# Patient Record
Sex: MALE | Race: WHITE | Employment: FULL TIME | URBAN - METROPOLITAN AREA
[De-identification: names, ages, dates, MRNs, and addresses within clinical notes are randomized per-mention and may not be internally consistent; named-entity substitution may affect disease eponyms.]

---

## 2017-03-06 RX ORDER — CEPHALEXIN 500 MG/1
CAPSULE ORAL
Qty: 8 CAPSULE | Refills: 1 | Status: SHIPPED | OUTPATIENT
Start: 2017-03-06 | End: 2020-11-24

## 2017-09-15 ENCOUNTER — TELEPHONE (OUTPATIENT)
Dept: ORTHOPEDIC SURGERY | Age: 57
End: 2017-09-15

## 2017-09-15 RX ORDER — CEPHALEXIN 500 MG/1
CAPSULE ORAL
Qty: 8 CAPSULE | Refills: 1 | Status: SHIPPED | OUTPATIENT
Start: 2017-09-15 | End: 2020-11-24

## 2018-01-21 ENCOUNTER — TELEPHONE (OUTPATIENT)
Dept: ORTHOPEDIC SURGERY | Age: 58
End: 2018-01-21

## 2018-01-25 ENCOUNTER — OFFICE VISIT (OUTPATIENT)
Dept: ORTHOPEDIC SURGERY | Age: 58
End: 2018-01-25

## 2018-01-25 VITALS
DIASTOLIC BLOOD PRESSURE: 84 MMHG | HEART RATE: 71 BPM | TEMPERATURE: 98.1 F | BODY MASS INDEX: 30.01 KG/M2 | SYSTOLIC BLOOD PRESSURE: 140 MMHG | RESPIRATION RATE: 16 BRPM | HEIGHT: 68 IN | WEIGHT: 198 LBS

## 2018-01-25 DIAGNOSIS — Z96.652 HISTORY OF TOTAL LEFT KNEE REPLACEMENT: Primary | ICD-10-CM

## 2018-01-25 PROCEDURE — 99202 OFFICE O/P NEW SF 15 MIN: CPT | Performed by: ORTHOPAEDIC SURGERY

## 2018-01-25 NOTE — PROGRESS NOTES
This dictation was done with [employer] dictation and may contain mechanical errors related to translation. Blood pressure (!) 140/84, pulse 71, temperature 98.1 °F (36.7 °C), temperature source Temporal, resp. rate 16, height 5' 8\" (1.727 m), weight 198 lb (89.8 kg).

## 2018-01-27 NOTE — PROGRESS NOTES
Patient is a 50-year-old male presents today complaining of swelling of his left total knee arthroplasty. The patient is status post index procedure performed in January 2012 now 6 years ago. This patient has not been seen in postop since his initial evaluation postoperatively. I have no x-rays is other than his initial postoperative films since he avoided or electively chose not to be seen. The patient has done very well with good range of motion is been very active plays a lot of golf and continues to work. He recently noted increased swelling of his knee but not significant increased pain. He has no new history of injury or surgery to the left kneee. Patient Active Problem List   Diagnosis    Left knee DJD    Physical exam, annual     Prior to Visit Medications    Medication Sig Taking? Authorizing Provider   diclofenac sodium (VOLTAREN) 1 % GEL Apply 4 g topically 4 times daily  Elizabeth Chaves MD   cephALEXin (KEFLEX) 500 MG capsule 2 tablets by mouth 1 hour before and 1 hour after procedure  Elizabeth Chaves MD   cephALEXin (KEFLEX) 500 MG capsule 2 tablets by mouth 1 hour before and 1 hour after procedure  Elizabeth Chaves MD   clindamycin (CLEOCIN) 300 MG capsule 1 tablet by mouth 1 hour before and 1 hour after procedure  Elizabeth Chaves MD   diclofenac sodium (VOLTAREN) 1 % GEL Apply 4 g topically 4 times daily  SHADY Gonsales   ibuprofen (ADVIL;MOTRIN) 200 MG tablet Take 200 mg by mouth every 6 hours as needed. Historical Provider, MD   diclofenac sodium (VOLTAREN) 1 % GEL Apply 4 g topically 4 times daily for 30 days. Elizabeth Chaves MD     Physical examination 50-year-old male oriented ×3 temperature is 98.1. Examination of the patient's back shows good range of motion no specific pain tenderness or instability. Motor exam shows quadriceps hamstrings hip abductors and abductors foot plantar dorsiflexors are intact 4-5 over 5 to the left lower extremity.   Sensation patient will eventually need to undergo revision however at this point time I see no septic event that would demand revision knee surgery. I would like to see him back in about 2-3 months or p.r.n. with repeat x-rays at that time. Also if this effusion persists we probably need to get a intra-articular aspiration culture and sent it for septic workup. We will follow him up as scheduled or p.r.n.

## 2018-08-30 ENCOUNTER — OFFICE VISIT (OUTPATIENT)
Dept: ORTHOPEDIC SURGERY | Age: 58
End: 2018-08-30

## 2018-08-30 VITALS — BODY MASS INDEX: 30.01 KG/M2 | TEMPERATURE: 97.3 F | HEIGHT: 68 IN | WEIGHT: 198 LBS

## 2018-08-30 DIAGNOSIS — M25.462 EFFUSION OF BURSA OF LEFT KNEE: ICD-10-CM

## 2018-08-30 DIAGNOSIS — Z96.652 HISTORY OF TOTAL LEFT KNEE REPLACEMENT: Primary | ICD-10-CM

## 2018-08-30 PROCEDURE — 20610 DRAIN/INJ JOINT/BURSA W/O US: CPT | Performed by: ORTHOPAEDIC SURGERY

## 2018-08-30 PROCEDURE — 99212 OFFICE O/P EST SF 10 MIN: CPT | Performed by: ORTHOPAEDIC SURGERY

## 2018-09-20 ENCOUNTER — TELEPHONE (OUTPATIENT)
Dept: ORTHOPEDIC SURGERY | Age: 58
End: 2018-09-20

## 2018-09-20 RX ORDER — CEPHALEXIN 500 MG/1
CAPSULE ORAL
Qty: 8 CAPSULE | Refills: 1 | Status: SHIPPED | OUTPATIENT
Start: 2018-09-20 | End: 2020-11-24

## 2018-11-29 ENCOUNTER — OFFICE VISIT (OUTPATIENT)
Dept: ORTHOPEDIC SURGERY | Age: 58
End: 2018-11-29
Payer: COMMERCIAL

## 2018-11-29 VITALS
HEART RATE: 75 BPM | SYSTOLIC BLOOD PRESSURE: 142 MMHG | WEIGHT: 198 LBS | BODY MASS INDEX: 30.01 KG/M2 | TEMPERATURE: 97.4 F | HEIGHT: 68 IN | DIASTOLIC BLOOD PRESSURE: 85 MMHG

## 2018-11-29 DIAGNOSIS — Z96.652 HISTORY OF TOTAL KNEE ARTHROPLASTY, LEFT: Primary | ICD-10-CM

## 2018-11-29 PROCEDURE — 99212 OFFICE O/P EST SF 10 MIN: CPT | Performed by: ORTHOPAEDIC SURGERY

## 2018-11-29 NOTE — PROGRESS NOTES
This dictation was done with [employer] dictation and may contain mechanical errors related to translation. Blood pressure (!) 142/85, pulse 75, temperature 97.4 °F (36.3 °C), temperature source Temporal, height 5' 8\" (1.727 m), weight 198 lb (89.8 kg).

## 2018-12-09 NOTE — PROGRESS NOTES
Patient is a 39-year-old male who is status post left total knee arthroplasty performed about 6 years ago on January 2012. We've been following him for increased swelling and x-ray findings which may show radiographic loosening and ostial lysis of the knee joint. He on his last office visit underwent aspiration of his knee joint and synovial evaluation. The results of his aspiration were negative for alpha defensins poly-peptide along with a white blood cell count of 1899 negative bacterial panels of pediatric knees Staphylococcus aureus [place] ([facility]) and enterococcus. To date there is no growth on the aspiration. Patient Active Problem List   Diagnosis    Left knee DJD     Prior to Visit Medications    Medication Sig Taking? Authorizing Provider   cephALEXin (KEFLEX) 500 MG capsule 2 tablets by mouth 1 hour before and 1 hour after procedure  Fide Morales MD   diclofenac sodium (VOLTAREN) 1 % GEL Apply 4 g topically 4 times daily  Fide Morales MD   cephALEXin (KEFLEX) 500 MG capsule 2 tablets by mouth 1 hour before and 1 hour after procedure  Fide Morales MD   cephALEXin (KEFLEX) 500 MG capsule 2 tablets by mouth 1 hour before and 1 hour after procedure  Fide Morales MD   clindamycin (CLEOCIN) 300 MG capsule 1 tablet by mouth 1 hour before and 1 hour after procedure  Fide Morales MD   diclofenac sodium (VOLTAREN) 1 % GEL Apply 4 g topically 4 times daily  SHADY Long CNP   ibuprofen (ADVIL;MOTRIN) 200 MG tablet Take 200 mg by mouth every 6 hours as needed. Historical Provider, MD   diclofenac sodium (VOLTAREN) 1 % GEL Apply 4 g topically 4 times daily for 30 days. Fide Morales MD     Physical examination 39-year-old male oriented ×3 temperature is 97.4. Examination of the left knee shows well-healed anterior scar full extension 130° of flexion with a mild effusion. Sensation and perfusion are intact to the left foot and ankle.   No obvious cutaneous lesions

## 2020-11-24 RX ORDER — CEPHALEXIN 500 MG/1
CAPSULE ORAL
Qty: 8 CAPSULE | Refills: 1 | Status: SHIPPED | OUTPATIENT
Start: 2020-11-24

## 2021-03-12 ENCOUNTER — NURSE ONLY (OUTPATIENT)
Dept: PRIMARY CARE CLINIC | Age: 61
End: 2021-03-12
Payer: COMMERCIAL

## 2021-03-12 DIAGNOSIS — Z23 HIGH PRIORITY FOR COVID-19 VIRUS VACCINATION: Primary | ICD-10-CM

## 2021-03-12 PROCEDURE — 91300 COVID-19, PFIZER VACCINE 30MCG/0.3ML DOSE: CPT | Performed by: NURSE PRACTITIONER

## 2021-03-12 PROCEDURE — 0001A COVID-19, PFIZER VACCINE 30MCG/0.3ML DOSE: CPT | Performed by: NURSE PRACTITIONER

## 2021-04-02 PROCEDURE — 0002A COVID-19, PFIZER VACCINE 30MCG/0.3ML DOSE: CPT | Performed by: NURSE PRACTITIONER

## 2021-04-02 PROCEDURE — 91300 COVID-19, PFIZER VACCINE 30MCG/0.3ML DOSE: CPT | Performed by: NURSE PRACTITIONER

## 2021-04-07 ENCOUNTER — NURSE ONLY (OUTPATIENT)
Dept: PRIMARY CARE CLINIC | Age: 61
End: 2021-04-07
Payer: COMMERCIAL

## 2021-04-07 DIAGNOSIS — Z23 HIGH PRIORITY FOR COVID-19 VIRUS VACCINATION: Primary | ICD-10-CM

## 2022-01-11 RX ORDER — AMOXICILLIN 500 MG/1
CAPSULE ORAL
Qty: 4 CAPSULE | Refills: 1 | Status: SHIPPED | OUTPATIENT
Start: 2022-01-11

## 2022-12-29 ENCOUNTER — TELEPHONE (OUTPATIENT)
Dept: ORTHOPEDIC SURGERY | Age: 62
End: 2022-12-29

## 2022-12-29 RX ORDER — AMOXICILLIN 500 MG/1
CAPSULE ORAL
Qty: 4 CAPSULE | Refills: 1 | Status: SHIPPED | OUTPATIENT
Start: 2022-12-29

## 2023-11-07 ENCOUNTER — TELEPHONE (OUTPATIENT)
Dept: ORTHOPEDIC SURGERY | Age: 63
End: 2023-11-07

## 2023-11-07 RX ORDER — AMOXICILLIN 500 MG/1
CAPSULE ORAL
Qty: 4 CAPSULE | Refills: 1 | Status: SHIPPED | OUTPATIENT
Start: 2023-11-07

## 2024-11-21 ENCOUNTER — TELEPHONE (OUTPATIENT)
Dept: ORTHOPEDIC SURGERY | Age: 64
End: 2024-11-21

## 2024-11-21 RX ORDER — AMOXICILLIN 500 MG/1
CAPSULE ORAL
Qty: 4 CAPSULE | Refills: 1 | Status: CANCELLED | OUTPATIENT
Start: 2024-11-21

## 2024-11-21 RX ORDER — AMOXICILLIN 500 MG/1
CAPSULE ORAL
Qty: 4 CAPSULE | Refills: 1 | Status: SHIPPED | OUTPATIENT
Start: 2024-11-21